# Patient Record
Sex: MALE | Race: WHITE | Employment: OTHER | URBAN - METROPOLITAN AREA
[De-identification: names, ages, dates, MRNs, and addresses within clinical notes are randomized per-mention and may not be internally consistent; named-entity substitution may affect disease eponyms.]

---

## 2017-08-14 ENCOUNTER — HOSPITAL ENCOUNTER (OUTPATIENT)
Dept: GENERAL RADIOLOGY | Age: 47
Discharge: HOME | End: 2017-08-14
Attending: INTERNAL MEDICINE
Payer: COMMERCIAL

## 2017-08-14 DIAGNOSIS — M79.644 PAIN IN RIGHT FINGER(S): ICD-10-CM

## 2017-08-14 PROCEDURE — 73140 X-RAY EXAM OF FINGER(S): CPT | Performed by: INTERNAL MEDICINE

## 2017-09-05 DIAGNOSIS — I60.9 SUBARACHNOID HEMORRHAGE (HCC): Primary | ICD-10-CM

## 2017-09-13 ENCOUNTER — OFFICE VISIT (OUTPATIENT)
Dept: NEUROLOGY | Facility: CLINIC | Age: 47
End: 2017-09-13

## 2017-09-13 VITALS
SYSTOLIC BLOOD PRESSURE: 112 MMHG | HEIGHT: 69 IN | BODY MASS INDEX: 25.33 KG/M2 | WEIGHT: 171 LBS | HEART RATE: 102 BPM | RESPIRATION RATE: 16 BRPM | DIASTOLIC BLOOD PRESSURE: 80 MMHG

## 2017-09-13 DIAGNOSIS — I60.9 SUBARACHNOID HEMORRHAGE (HCC): Primary | ICD-10-CM

## 2017-09-13 PROCEDURE — 99214 OFFICE O/P EST MOD 30 MIN: CPT | Performed by: OTHER

## 2017-09-13 NOTE — PROGRESS NOTES
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ventional[facility] on 9/13/2017    A/P:   History of Subarachnoid hemorrhage : Perimesencephalic , neurologically intact, recovered very well.   AVF (arteriovenous fistula) ([address] dural AVF   Plan:   Advised pt to cont follow up with Allyson NEAL and ZEE  PCP follow up  Fr

## 2017-09-13 NOTE — PATIENT INSTRUCTIONS
Refill policies:    • Allow 2-3 business days for refills; controlled substances may take longer.   • Contact your pharmacy at least 5 days prior to running out of medication and have them send an electronic request or submit request through the [facility] have a procedure or additional testing performed. [facility]) will contact your insurance carrier to obtain pre-certification or prior authorization.     Unfortunately, [employer] has seen an increase in denial of payment even though the p

## 2017-09-26 ENCOUNTER — HOSPITAL ENCOUNTER (OUTPATIENT)
Dept: MRI IMAGING | Facility: HOSPITAL | Age: 47
Discharge: HOME | End: 2017-09-26
Payer: COMMERCIAL

## 2017-09-26 DIAGNOSIS — I60.9 SUBARACHNOID HEMORRHAGE (HCC): ICD-10-CM

## 2017-09-26 PROCEDURE — 70544 MR ANGIOGRAPHY HEAD W/O DYE: CPT

## 2018-02-22 ENCOUNTER — TELEPHONE (OUTPATIENT)
Dept: SURGERY | Facility: CLINIC | Age: 48
End: 2018-02-22

## 2018-02-22 NOTE — TELEPHONE ENCOUNTER
LMTCB:  Called pt back wanted to get more information regarding patient's concerns or questions   Also need to inform pt Dr. Jose Luis Davenport is not due back in our office until 3/6,   We can send Dr. Jose Luis Davenport an email, or attempt to call him next week to relay messag

## 2018-02-22 NOTE — TELEPHONE ENCOUNTER
pt wants to talk to Dr romero: an email that was sent to [facility] email about a month ago & will resend today. Please have Dr address this email & call pt back.  Pt didn't want to go into detail over the phone w/me

## 2018-02-26 NOTE — TELEPHONE ENCOUNTER
Spoke to pt who states he does not want to come in,   [facility] Email sent to Dr. Nereyda Ratliff explains in great detail what is going on.   States he wants to participate in an activity he has been looking forward to for a long time an needs  to give him th

## 2018-03-27 NOTE — TELEPHONE ENCOUNTER
Spoke to Dr. Devan Nelson he states in his medical opinion pt should not be Boxing, and does not genesis clearance for upcoming boxing tournament. Even with protective head gear there is risk to pt due to his AVF.      Called and left detailed message on personali

## 2019-09-09 ENCOUNTER — TELEPHONE (OUTPATIENT)
Dept: SURGERY | Facility: CLINIC | Age: 49
End: 2019-09-09

## 2019-09-09 DIAGNOSIS — I77.0 AVF (ARTERIOVENOUS FISTULA) (HCC): Primary | ICD-10-CM

## 2019-09-09 NOTE — TELEPHONE ENCOUNTER
----- Message from Nury Dong RN sent at 9/28/2016  8:28 AM CDT -----  MRA head w/o contrast due Oct 2017.   Patient also due for Angio OCT 2019    Per Dr. Rajat Aguila patient will probably require conventional DSA study at 4-5 years to definitively asses

## 2019-09-25 NOTE — TELEPHONE ENCOUNTER
[facility] to schedule procedure.   Offer 10/16/19 (Wednesday)    Patient scheduled for Conventional Angiogram on TBD at [facility] with Dr. Ortiz Nurse    Discussed Angiogram instructions with patient:    · Arrive one hour prior to scheduled procedure start time and get r swelling and bruising at the groin puncture site  · You may have a mild headache, keep well hydrated and rest.

## 2019-10-22 NOTE — TELEPHONE ENCOUNTER
Patient has not called back to schedule. If patient calls back will go over available dates and below instructions.

## 2019-11-11 PROBLEM — K13.0 ANGULAR CHEILITIS: Status: ACTIVE | Noted: 2019-11-11

## 2019-11-11 PROBLEM — G47.30 SLEEP-DISORDERED BREATHING: Status: ACTIVE | Noted: 2019-11-11

## 2019-11-11 PROBLEM — N52.9 ERECTILE DYSFUNCTION, UNSPECIFIED ERECTILE DYSFUNCTION TYPE: Status: ACTIVE | Noted: 2019-11-11

## 2019-11-11 PROBLEM — R35.1 NOCTURIA: Status: ACTIVE | Noted: 2019-11-11

## 2024-01-19 NOTE — TELEPHONE ENCOUNTER
Spoke with SERGEY Branch who stated conventional DSA study is a conventional angiogram.     Patient will need to be called to be scheduled for angiogram. normal appearance